# Patient Record
Sex: FEMALE | Race: WHITE | NOT HISPANIC OR LATINO | Employment: OTHER | URBAN - METROPOLITAN AREA
[De-identification: names, ages, dates, MRNs, and addresses within clinical notes are randomized per-mention and may not be internally consistent; named-entity substitution may affect disease eponyms.]

---

## 2018-04-02 ENCOUNTER — CONVERSION ENCOUNTER (OUTPATIENT)
Dept: OTHER | Facility: HOSPITAL | Age: 78
End: 2018-04-02

## 2018-04-02 ENCOUNTER — OFFICE VISIT CONVERTED (OUTPATIENT)
Dept: CARDIOLOGY | Facility: CLINIC | Age: 78
End: 2018-04-02
Attending: SPECIALIST

## 2018-07-05 ENCOUNTER — OFFICE VISIT CONVERTED (OUTPATIENT)
Dept: CARDIOLOGY | Facility: CLINIC | Age: 78
End: 2018-07-05
Attending: SPECIALIST

## 2018-07-05 ENCOUNTER — CONVERSION ENCOUNTER (OUTPATIENT)
Dept: OTHER | Facility: HOSPITAL | Age: 78
End: 2018-07-05

## 2018-11-05 ENCOUNTER — CONVERSION ENCOUNTER (OUTPATIENT)
Dept: OTHER | Facility: HOSPITAL | Age: 78
End: 2018-11-05

## 2018-11-05 ENCOUNTER — OFFICE VISIT CONVERTED (OUTPATIENT)
Dept: CARDIOLOGY | Facility: CLINIC | Age: 78
End: 2018-11-05
Attending: SPECIALIST

## 2019-05-06 ENCOUNTER — CONVERSION ENCOUNTER (OUTPATIENT)
Dept: OTHER | Facility: HOSPITAL | Age: 79
End: 2019-05-06

## 2019-05-06 ENCOUNTER — OFFICE VISIT CONVERTED (OUTPATIENT)
Dept: CARDIOLOGY | Facility: CLINIC | Age: 79
End: 2019-05-06
Attending: SPECIALIST

## 2019-11-11 ENCOUNTER — OFFICE VISIT CONVERTED (OUTPATIENT)
Dept: CARDIOLOGY | Facility: CLINIC | Age: 79
End: 2019-11-11
Attending: SPECIALIST

## 2019-11-11 ENCOUNTER — CONVERSION ENCOUNTER (OUTPATIENT)
Dept: OTHER | Facility: HOSPITAL | Age: 79
End: 2019-11-11

## 2020-07-20 ENCOUNTER — OFFICE VISIT CONVERTED (OUTPATIENT)
Dept: CARDIOLOGY | Facility: CLINIC | Age: 80
End: 2020-07-20
Attending: SPECIALIST

## 2020-08-05 ENCOUNTER — OFFICE VISIT CONVERTED (OUTPATIENT)
Dept: NEUROSURGERY | Facility: CLINIC | Age: 80
End: 2020-08-05
Attending: PHYSICIAN ASSISTANT

## 2020-09-09 ENCOUNTER — OFFICE VISIT CONVERTED (OUTPATIENT)
Dept: NEUROSURGERY | Facility: CLINIC | Age: 80
End: 2020-09-09
Attending: PHYSICIAN ASSISTANT

## 2021-01-25 ENCOUNTER — OFFICE VISIT CONVERTED (OUTPATIENT)
Dept: CARDIOLOGY | Facility: CLINIC | Age: 81
End: 2021-01-25
Attending: SPECIALIST

## 2021-05-10 NOTE — H&P
History and Physical      Patient Name: Leonora Yu   Patient ID: [id number]   Sex: Female   YOB: 1940    Primary Care Provider: Stan Corea MD   Referring Provider: Stan Corea MD    Visit Date: August 5, 2020    Provider: Gillian Cho PA-C   Location: [facility]   Location Address: [address] [place] [address]  [id number]   Location Phone: [phone]          Chief Complaint  · Back pain      History Of Present Illness  The patient is a 79 year old /White female, who presents on referral from Stan Corea MD, for a neurosurgical evaluation of low back pain and and numbness in her feet.   The pain developed gradually over ten years but worse over the past several months. It is mild to severe depending on activity level. has an aching, a sharp, and quality and does not radiate. The pain has been constant. The pain tends to be maximal at no specific time, but waxes and wanes in severity throughout the day. The patient states the pain is aggravated by bending, lifting, and housework. Improves some with rest.   She denies bowel or bladder dysfunction. The patient has no prior history of neck or back surgery.   RECENT INTERVENTIONS:  She has been previously treated with NSAIDs.   INFORMATION REVIEWED:  The following information was reviewed: radiology reports. an MRI of the lumbar spine and from 2011 report describes foraminal stenosis at L3/4 with spondylolisthesis at this level.      Numbness in the feet worse over the past two months and difficulty driving due to the numbness. He  is disabled and she needs to be able to drive. She is not a diabetic.       Past Medical History  Arrhythmia; Candidiasis of skin; Frequency; High blood pressure; High cholesterol; Hypothyroidism; Mixed Incontinence; Stress Incontinence; Urge Incontinence; Urgency         Past Surgical History  Hysterectomy; Hysterectomy-Abdominal         Medication List  Crestor oral;  "hydrochlorothiazide oral; lisinopril oral; nystatin 100,000 unit/gram topical cream; Premarin oral; Synthroid oral; Toprol XL oral; Toprol XL 25 mg oral tablet extended release 24 hr         Allergy List  NO KNOWN DRUG ALLERGIES; niacin       Allergies Reconciled  Family Medical History  Breast Neoplasm, Malignant; Heart Attack (MI); Family history of breast cancer         Social History  Alcohol (Never); Caffeine (Current some day); lives with spouse; ; Retired; Second hand smoke exposure (Never); Tobacco (Never)         Review of Systems  · Constitutional  o Denies  o : chills, excessive sweating, fatigue, fever, sycope/passing out, weight gain, weight loss  · Eyes  o Denies  o : changes in vision, blurry vision, double vision  · HENT  o Denies  o : loss of hearing, ringing in the ears, ear aches, sore throat, nasal congestion, sinus pain, nose bleeds, seasonal allergies  · Cardiovascular  o Denies  o : blood clots, swollen legs, anemia, easy burising or bleeding, transfusions  · Respiratory  o Denies  o : shortness of breath, dry cough, productive cough, pneumonia, COPD  · Gastrointestinal  o Denies  o : difficulty swallowing, reflux  · Genitourinary  o Denies  o : incontinence  · Neurologic  o Admits  o : numbness/tingling/paresthesia   o Denies  o : headache, seizure, stroke, tremor, loss of balance, falls, dizziness/vertigo, difficulty with sleep, difficulty with coordination, difficulty with dexterity, weakness  · Musculoskeletal  o Admits  o : low back pain  o Denies  o : neck stiffness/pain, swollen lymph nodes, muscle aches, joint pain, weakness, spasms, sciatica, pain radiating in arm, pain radiating in leg  · Endocrine  o Denies  o : diabetes, thyroid disorder  · Psychiatric  o Denies  o : anxiety, depression  · All Others Negative      Vitals  Date Time BP Position Site L\R Cuff Size HR RR TEMP (F) WT  HT  BMI kg/m2 BSA m2 O2 Sat        08/05/2020 10:28 AM        97.3 178lbs 9oz 5'  2\" 32.66 " 1.88           Physical Examination  · Constitutional  o Appearance  o : well-nourished, well developed, alert, in no acute distress  · Respiratory  o Respiratory Effort  o : breathing unlabored  · Cardiovascular  o Peripheral Vascular System  o :   § Extremities  § : no edema or cyanosis  · Musculoskeletal  o Spine  o :   § Inspection/Palpation  § : no spinal tenderness or misalignment  o Right Lower Extremity  o :   § Inspection/Palpation  § : no joint or limb tenderness to palpation, no edema present, no ecchymosis  § Joint Stability  § : joint stability within normal limits  § Range of Motion  § : range of motion normal, no joint crepitations present, no pain on motion, Marco's test negative  o Left Lower Extremity  o :   § Inspection/Palpation  § : no joint or limb tenderness to palpation, no edema present, no ecchymosis  § Joint Stability  § : joint stability within normal limits  § Range of Motion  § : range of motion normal, no joint crepitations present, no pain on motion, Marco's test negative  · Skin and Subcutaneous Tissue  o Extremities  o :   § Right Lower Extremity  § : no lesions or areas of discoloration  § Left Lower Extremity  § : no lesions or areas of discoloration  o Back  o : no lesions or areas of discoloration  · Neurologic  o Mental Status Examination  o :   § Orientation  § : alert and oriented to time, person, place and events  o Motor Examination  o :   § RLE Strength  § : strength normal  § RLE Motor Function  § : tone normal, no atrophy, no abnormal movements noted  § LLE Strength  § : strength normal  § LLE Motor Function  § : tone normal, no atrophy, no abnormal movements noted  o Reflexes  o :   § RLE  § : knee and ankle reflexes 1/4, SLR negative  § LLE  § : knee and ankle reflexes 1/4, SLR negative  o Sensation  o :   § Light Touch  § : sensation intact to light touch in extremities  o Gait and Station  o :   § Gait Screening  § : normal gait, able to stand without  difficulty  · Psychiatric  o Mood and Affect  o : mood normal, affect appropriate          Assessment  · Acquired spondylolisthesis , lumbar     738.4/M43.19  L3/4  · Degeneration of lumbosacral intervertebral disc     722.52/M51.37  · Foraminal stenosis of lumbar region     724.02/M99.83  · Lumbago/low back pain     724.3/M54.40  · Paresthesia     782.0/R20.2    Problems Reconciled  Plan  · Orders  o MRI lumbar spine wo contrast (32087) - 738.4/M43.19, 724.02/M99.83, 724.3/M54.40, 782.0/R20.2 - 08/05/2020   [facility] on a Wed. or Friday  o XR lumbar spine, 2-3 bending views (53334) - 738.4/M43.19 - 08/05/2020  · Medications  o Medications have been Reconciled  o Transition of Care or Provider Policy  · Instructions  o Encouraged to follow-up with Primary Care Provider for preventative care.  o The ROS and the PFSH were reviewed at today's visit.  o Call or return to office if symptoms worsen or persist.  o I will order a MRI lumbar spine. I am concerned for lumbar stenosis. Lumbar bending xrays needed to evaluate for instability at L3/4 with spondylolisthesis. F/U to discuss results.             Electronically Signed by: Gillian Cho PA-C -Author on August 5, 2020 11:48:58 AM

## 2021-05-13 NOTE — PROGRESS NOTES
"   Progress Note      Patient Name: Leonora Yu   Patient ID: [id number]   Sex: Female   YOB: 1940    Primary Care Provider: Stan Corea MD   Referring Provider: Stan Corea MD    Visit Date: July 20, 2020    Provider: Jerome Morgan MD   Location: [facility]   Location Address: [address] [place] [address]  [id number]   Location Phone: [phone]          Chief Complaint  · Hypertension  · Palpitations      History Of Present Illness  Leonora Yu is an 79 year old /White female with a history of hypertension; palpitations. No further palpitations. No syncopal or presyncopal episodes. Patient denies chest pain. No shortness of breath, PND or orthopnea.   Medications  Meds at present include: Lisinopril 10 mg qd; Toprol XL 25 mg bid; Tramadol 50 mg prn; Hydrochlorothiazide 25 mg qod; Crestor 10 mg qod; Levothyroxine 125 mcg qd; Potassium Citrate 15 meq bid; Vitamin D3 q week.   PAST MEDICAL HISTORY: negative for diabetes and chest pain; positive for hypertension and hyperlipidemia.   FAMILY HISTORY: negative for diabetes, hypertension and heart disease.   PSYCHO/SOCIAL HISTORY: negative for depression and mood changes; does not drink alcohol and does not smoke.       Review of Systems  · Cardiovascular  o Denies  o : palpitations (fast, fluttering, or skipping beats), swelling (feet, ankles, hands), shortness of breath while walking or lying flat, chest pain or angina pectoris   · Respiratory  o Denies  o : chronic or frequent cough, asthma or wheezing      Vitals  Date Time BP Position Site L\R Cuff Size HR RR TEMP (F) WT  HT  BMI kg/m2 BSA m2 O2 Sat HC       07/20/2020 10:26 /66 Sitting    85 - R   179lbs 6oz 5'  2\" 32.81 1.89     07/20/2020 10:26 /80 Sitting    88 - R   179lbs 6oz 5'  2\" 32.81 1.89           Physical Examination  · Constitutional  o Appearance  o : Alert and Oriented X3. The patient is well built and well " nourished.  · Respiratory  o Inspection of Chest  o : No chest wall deformities, moving equal  o Auscultation of Lungs  o : Good air entry with vesicular breath sounds  · Cardiovascular  o Heart  o :   § Auscultation of Heart  § : S1 and S2 irregular. No S3. No S4. No murmurs.  o Peripheral Vascular System  o :   § Extremities  § : Peripheral pulses were well felt. No edema. No cyanosis.  · Gastrointestinal  o Abdominal Examination  o : No masses or tenderness noted.   · EKG  o EKG  o : was done today  o Indications  o : hypertension  o Results  o : shows sinus rhythm; right atrial enlargement; left axis deviation  o Comparison  o : no change from previous EKG          Assessment     IMPRESSION/PLAN    1.  Palpitations stable. Continue current dose of Toprol.  2.  Essential hypertension controlled.  Continue current dose of Lisinopril and Hyrochlorathiazide.    3.  Hyperlipidemia. Continue current dose of Crestor managed by her PMD.    4.  See me back in 6 months.      MD HOMERO Eugene/wt       Plan  · Instructions  o This note was transcribed by Lilian Rod. HOMERO/wt  o The above service was transcribed by Lilian Rod on my behalf and I attest to the accuracy of the note. HOMERO            Electronically Signed by: Linnea Rod-, -Author on July 22, 2020 10:29:38 AM  Electronically Co-signed by: Jerome Morgan MD -Reviewer on July 24, 2020 08:35:50 AM

## 2021-05-14 VITALS — TEMPERATURE: 97.1 F | HEIGHT: 62 IN | BODY MASS INDEX: 33.35 KG/M2 | WEIGHT: 181.25 LBS

## 2021-05-14 VITALS
HEART RATE: 90 BPM | DIASTOLIC BLOOD PRESSURE: 85 MMHG | HEIGHT: 62 IN | BODY MASS INDEX: 33.88 KG/M2 | SYSTOLIC BLOOD PRESSURE: 145 MMHG | WEIGHT: 184.12 LBS

## 2021-05-14 NOTE — PROGRESS NOTES
"   Progress Note      Patient Name: Leonora Yu   Patient ID: [id number]   Sex: Female   YOB: 1940    Primary Care Provider: Stan Corea MD   Referring Provider: Stan Corea MD    Visit Date: January 25, 2021    Provider: Jerome Morgan MD   Location: [facility]   Location Address: [address] [place] [address]  [id number]   Location Phone: [phone]          Chief Complaint  · Hypertension  · Palpitations      History Of Present Illness  Leonora Yu is an 79 year old /White female with a history of hypertension; palpitations. No further palpitations. No syncopal or presyncopal episodes. Patient denies chest pain. No shortness of breath, PND or orthopnea.   Medications  Meds at present include: Lisinopril 10 mg qd; Toprol Succ ER2 5 mg bid; Hydrochlorothiazide 25 mg qod; Crestor 10 mg qod; Levothyroxine 125 mcg qd; Potassium Citrate 15 meq bid; Vitamin D2 q week.   PAST MEDICAL HISTORY: negative for diabetes and chest pain; positive for hypertension and hyperlipidemia.   PSYCHO/SOCIAL HISTORY: does not drink alcohol and does not smoke.       Review of Systems  · Cardiovascular  o Denies  o : palpitations (fast, fluttering, or skipping beats), swelling (feet, ankles, hands), shortness of breath while walking or lying flat, chest pain or angina pectoris   · Respiratory  o Denies  o : chronic or frequent cough, asthma or wheezing      Vitals  Date Time BP Position Site L\R Cuff Size HR RR TEMP (F) WT  HT  BMI kg/m2 BSA m2 O2 Sat FR L/min FiO2 HC       01/25/2021 11:31 /85 Sitting    90 - R   184lbs 2oz 5'  2\" 33.68 1.91       01/25/2021 11:31 /78 Sitting    88 - R                   Physical Examination  · Constitutional  o Appearance  o : Alert and Oriented X3. The patient is well built and well nourished.  · Respiratory  o Inspection of Chest  o : No chest wall deformities, moving equal  o Auscultation of Lungs  o : Good air " entry with vesicular breath sounds  · Cardiovascular  o Heart  o :   § Auscultation of Heart  § : S1 and S2 irregular. No S3. No S4. No murmurs.  o Peripheral Vascular System  o :   § Extremities  § : Peripheral pulses were well felt. No edema. No cyanosis.  · Gastrointestinal  o Abdominal Examination  o : No masses or tenderness noted.           Assessment     IMPRESSION/PLAN    1.  Palpitations stable. Continue current dose of Toprol.  2.  Essential hypertension controlled.  Continue current dose of Lisinopril and Hyrochlorathiazide.    3.  Hyperlipidemia. Continue current dose of Crestor managed by her PMD.    4.  See me back in 6 months.      MD HOMERO Eugene/wt       Plan  · Instructions  o This note was transcribed by Lilian Rod. HOMERO/wt  o The above service was transcribed by Lilian Rod on my behalf and I attest to the accuracy of the note. HOMERO            Electronically Signed by: Linnea Rod-, -Author on January 26, 2021 02:17:17 PM  Electronically Co-signed by: Jerome Morgan MD -Reviewer on February 8, 2021 01:32:14 PM

## 2021-05-15 VITALS — WEIGHT: 178.56 LBS | HEIGHT: 62 IN | BODY MASS INDEX: 32.86 KG/M2 | TEMPERATURE: 97.3 F

## 2021-05-15 VITALS
SYSTOLIC BLOOD PRESSURE: 146 MMHG | WEIGHT: 179.37 LBS | BODY MASS INDEX: 33.01 KG/M2 | HEIGHT: 62 IN | DIASTOLIC BLOOD PRESSURE: 66 MMHG | HEART RATE: 85 BPM

## 2021-05-15 VITALS
HEIGHT: 62 IN | SYSTOLIC BLOOD PRESSURE: 133 MMHG | HEART RATE: 72 BPM | DIASTOLIC BLOOD PRESSURE: 70 MMHG | BODY MASS INDEX: 32.02 KG/M2 | WEIGHT: 174 LBS

## 2021-05-15 VITALS
HEIGHT: 62 IN | BODY MASS INDEX: 32.76 KG/M2 | WEIGHT: 178 LBS | HEART RATE: 79 BPM | SYSTOLIC BLOOD PRESSURE: 124 MMHG | DIASTOLIC BLOOD PRESSURE: 82 MMHG

## 2021-05-16 VITALS
SYSTOLIC BLOOD PRESSURE: 130 MMHG | DIASTOLIC BLOOD PRESSURE: 81 MMHG | WEIGHT: 180 LBS | HEIGHT: 62 IN | BODY MASS INDEX: 33.13 KG/M2 | HEART RATE: 72 BPM

## 2021-05-16 VITALS
SYSTOLIC BLOOD PRESSURE: 159 MMHG | DIASTOLIC BLOOD PRESSURE: 96 MMHG | HEIGHT: 63 IN | WEIGHT: 180 LBS | BODY MASS INDEX: 31.89 KG/M2 | HEART RATE: 76 BPM

## 2021-05-16 VITALS
SYSTOLIC BLOOD PRESSURE: 131 MMHG | DIASTOLIC BLOOD PRESSURE: 82 MMHG | BODY MASS INDEX: 33.13 KG/M2 | HEIGHT: 62 IN | WEIGHT: 180 LBS | HEART RATE: 62 BPM

## 2021-09-18 NOTE — PROGRESS NOTES
[facility]  Cardiology progress Note    Patient Name: Leonora Yu  : 1940    CHIEF COMPLAINT  Hypertension, palpitations.      Subjective   Subjective     HISTORY OF PRESENT ILLNESS  Leonora Yu is an 80 year old /White female with a history of hypertension; palpitations. No further palpitations. No syncopal or presyncopal episodes. Patient denies chest pain. No shortness of breath, PND or orthopnea.      Review of Systems:   Constitutional no fever,  no weight loss   Skin no rash   Otolaryngeal no difficulty swallowing   Cardiovascular See HPI   Pulmonary no cough, no sputum production   Gastrointestinal no constipation, no diarrhea   Genitourinary no dysuria, no hematuria   Hematologic no easy bruisability, no abnormal bleeding   Musculoskeletal no muscle pain   Neurologic no dizziness, no falls         Personal History     Social History:  reports that she has never smoked. She has never used smokeless tobacco. She reports previous alcohol use. She reports that she does not use drugs.    Home Medications:  Current Outpatient Medications on File Prior to Visit   Medication Sig   • levothyroxine (SYNTHROID, LEVOTHROID) 125 MCG tablet Take 125 mcg by mouth Daily.   • metoprolol succinate XL (TOPROL-XL) 25 MG 24 hr tablet Take 25 mg by mouth 2 (two) times a day.   • potassium chloride (Klor-Con) 20 MEQ packet Take 1 packet by mouth 4 (Four) Times a Day.   • [DISCONTINUED] hydroCHLOROthiazide (HYDRODIURIL) 25 MG tablet Take 25 mg by mouth Daily.   • [DISCONTINUED] lisinopril (PRINIVIL,ZESTRIL) 10 MG tablet Take 10 mg by mouth Daily.     No current facility-administered medications on file prior to visit.     Allergies:  Allergies   Allergen Reactions   • Niacin Rash       Objective    Objective       Vitals:   Heart Rate:  [72-74] 74  BP: (148-158)/(88-94) 148/88  Body mass index is 33.65 kg/m².     Physical Exam:   Constitutional: Awake, alert, No acute distress    Eyes:  PERRLA, sclerae anicteric, no conjunctival injection   HENT: NCAT, mucous membranes moist   Neck: Supple, no thyromegaly, no lymphadenopathy, trachea midline   Respiratory: Clear to auscultation bilaterally, nonlabored respirations    Cardiovascular: RRR, no murmurs or rubs. Palpable pedal pulses bilaterally   Musculoskeletal: No bilateral ankle edema, no cyanosis to extremities   Psychiatric: Appropriate affect, cooperative   Neurologic: Oriented x 3, strength symmetric in all extremities, Cranial Nerves grossly intact to confrontation, speech clear   Skin: No rashes.    Result Review    Result Review:  I have personally reviewed the available results from  [x]  Laboratory  [x]  EKG  [x]  Cardiology  [x]  Medications  [x]  Old records  []  Other:   Procedures    Impression/Plan:  1.  Palpitations stable. Continue current dose of Toprol.  2.  Essential hypertension controlled.  Continue current dose of Lisinopril and Hyrochlorathiazide.  Blood pressure well controlled at home.  3.  Hyperlipidemia. Continue current dose of Crestor.  Check lipid and hepatic profile.  4.  Hypothyroidism: Continue Synthroid.  Jerome Morgan MD   09/20/21   12:14 EDT

## 2021-09-20 ENCOUNTER — OFFICE VISIT (OUTPATIENT)
Dept: CARDIOLOGY | Facility: CLINIC | Age: 81
End: 2021-09-20

## 2021-09-20 VITALS
DIASTOLIC BLOOD PRESSURE: 88 MMHG | HEART RATE: 74 BPM | SYSTOLIC BLOOD PRESSURE: 148 MMHG | BODY MASS INDEX: 33.86 KG/M2 | HEIGHT: 62 IN | WEIGHT: 184 LBS

## 2021-09-20 DIAGNOSIS — E02 SUBCLINICAL IODINE-DEFICIENCY HYPOTHYROIDISM: ICD-10-CM

## 2021-09-20 DIAGNOSIS — I10 HYPERTENSION, ESSENTIAL: ICD-10-CM

## 2021-09-20 DIAGNOSIS — E78.2 HYPERLIPEMIA, MIXED: Primary | ICD-10-CM

## 2021-09-20 PROCEDURE — 99214 OFFICE O/P EST MOD 30 MIN: CPT | Performed by: SPECIALIST

## 2021-09-20 RX ORDER — POTASSIUM CHLORIDE 1.5 G/1.77G
1 POWDER, FOR SOLUTION ORAL 4 TIMES DAILY
COMMUNITY
End: 2022-10-10 | Stop reason: ALTCHOICE

## 2021-09-20 RX ORDER — METOPROLOL SUCCINATE 25 MG/1
25 TABLET, EXTENDED RELEASE ORAL 2 TIMES DAILY
COMMUNITY
Start: 2021-07-09 | End: 2022-02-15

## 2021-09-20 RX ORDER — LISINOPRIL 10 MG/1
10 TABLET ORAL DAILY
Qty: 90 TABLET | Refills: 3 | Status: SHIPPED | OUTPATIENT
Start: 2021-09-20 | End: 2022-12-19

## 2021-09-20 RX ORDER — HYDROCHLOROTHIAZIDE 25 MG/1
25 TABLET ORAL DAILY
Qty: 90 TABLET | Refills: 3 | Status: SHIPPED | OUTPATIENT
Start: 2021-09-20

## 2021-09-20 RX ORDER — LISINOPRIL 10 MG/1
10 TABLET ORAL DAILY
COMMUNITY
Start: 2021-08-18 | End: 2021-09-20 | Stop reason: SDUPTHER

## 2021-09-20 RX ORDER — LEVOTHYROXINE SODIUM 0.12 MG/1
125 TABLET ORAL DAILY
COMMUNITY

## 2021-09-20 RX ORDER — HYDROCHLOROTHIAZIDE 25 MG/1
25 TABLET ORAL DAILY
COMMUNITY
End: 2021-09-20 | Stop reason: SDUPTHER

## 2022-02-15 RX ORDER — METOPROLOL SUCCINATE 25 MG/1
TABLET, EXTENDED RELEASE ORAL
Qty: 180 TABLET | Refills: 2 | Status: SHIPPED | OUTPATIENT
Start: 2022-02-15 | End: 2023-01-04

## 2022-04-03 NOTE — PROGRESS NOTES
[facility]  Cardiology progress Note    Patient Name: Leonora Yu  : 1940    CHIEF COMPLAINT  Palpitations.      Subjective   Subjective     HISTORY OF PRESENT ILLNESS    Leonora Yu is a 81 y.o. female with history of palpitations.  No chest pain or shortness of breath.    Review of Systems:   Constitutional no fever,  no weight loss   Skin no rash   Otolaryngeal no difficulty swallowing   Cardiovascular See HPI   Pulmonary no cough, no sputum production   Gastrointestinal no constipation, no diarrhea   Genitourinary no dysuria, no hematuria   Hematologic no easy bruisability, no abnormal bleeding   Musculoskeletal no muscle pain   Neurologic no dizziness, no falls         Personal History     Social History:  reports that she has never smoked. She has never used smokeless tobacco. She reports previous alcohol use. She reports that she does not use drugs.    Home Medications:  Current Outpatient Medications on File Prior to Visit   Medication Sig   • hydroCHLOROthiazide (HYDRODIURIL) 25 MG tablet Take 1 tablet by mouth Daily.   • levothyroxine (SYNTHROID, LEVOTHROID) 125 MCG tablet Take 125 mcg by mouth Daily.   • lisinopril (PRINIVIL,ZESTRIL) 10 MG tablet Take 1 tablet by mouth Daily.   • metoprolol succinate XL (TOPROL-XL) 25 MG 24 hr tablet TAKE ONE TABLET BY MOUTH TWICE A DAY   • potassium chloride (KLOR-CON) 20 MEQ packet Take 1 packet by mouth 4 (Four) Times a Day.     No current facility-administered medications on file prior to visit.     Allergies:  Allergies   Allergen Reactions   • Niacin Rash       Objective    Objective       Vitals:   Heart Rate:  [78] 78  BP: (140)/(74) 140/74  Body mass index is 33.11 kg/m².     Physical Exam:   Constitutional: Awake, alert, No acute distress    Eyes: PERRLA, sclerae anicteric, no conjunctival injection   HENT: NCAT, mucous membranes moist   Neck: Supple, no thyromegaly, no lymphadenopathy, trachea midline   Respiratory: Clear to  auscultation bilaterally, nonlabored respirations    Cardiovascular: RRR, no murmurs or rubs. Palpable pedal pulses bilaterally   Musculoskeletal: No bilateral ankle edema, no cyanosis to extremities   Psychiatric: Appropriate affect, cooperative   Neurologic: Oriented x 3, strength symmetric in all extremities, Cranial Nerves grossly intact to confrontation, speech clear   Skin: No rashes.    Result Review    Result Review:  I have personally reviewed the available results from  [x]  Laboratory  [x]  EKG  [x]  Cardiology  [x]  Medications  [x]  Old records  []  Other:   Procedures     Impression/Plan:  1. Stable palpitations: Continue Toprol-XL 25 mg once a day.  Further palpitations.  2. Essential hypertension controlled: Continue Zestril 10 mg a day.  Continue hydrochlorothiazide 25 mg once a day.  Pressure controlled at home.  2.  Hypothyroidism: Continue Synthroid 125 mcg once a day.        Jerome Morgan MD   04/04/22   11:15 EDT

## 2022-04-04 ENCOUNTER — OFFICE VISIT (OUTPATIENT)
Dept: CARDIOLOGY | Facility: CLINIC | Age: 82
End: 2022-04-04

## 2022-04-04 VITALS
WEIGHT: 181 LBS | DIASTOLIC BLOOD PRESSURE: 74 MMHG | HEIGHT: 62 IN | BODY MASS INDEX: 33.31 KG/M2 | SYSTOLIC BLOOD PRESSURE: 140 MMHG | HEART RATE: 78 BPM

## 2022-04-04 DIAGNOSIS — I10 HYPERTENSION, ESSENTIAL: ICD-10-CM

## 2022-04-04 DIAGNOSIS — E78.2 HYPERLIPEMIA, MIXED: ICD-10-CM

## 2022-04-04 DIAGNOSIS — R00.2 PALPITATIONS: Primary | ICD-10-CM

## 2022-04-04 PROCEDURE — 99214 OFFICE O/P EST MOD 30 MIN: CPT | Performed by: SPECIALIST

## 2022-06-01 NOTE — PROGRESS NOTES
Progress Note      Patient Name: Leonora Yu   Patient ID: [id number]   Sex: Female   YOB: 1940    Primary Care Provider: Stan Corea MD   Referring Provider: Stan Corea MD    Visit Date: September 9, 2020    Provider: Gillian Cho PA-C   Location: [facility]   Location Address: [address] [place] [address]  [id number]   Location Phone: [phone]          Chief Complaint  · Follow-up      History Of Present Illness  The patient is a 79 year old /White female who is in the office for followup appointment.      Here for f/u to discuss imaging.  MRI lumbar spine showed levoscoliosis with spondylolisthesis at L2/3, L3/4, and L4/5.  Multilevel degenerative changes. Her feet feel numb and heavy and this effect her driving.  She does not have clear lumbar stenosis to explain her symptoms in her feet.  She is on B12 injections at this time which could be a B12 deficit neuropathy.       Past Medical History  Arrhythmia; Candidiasis of skin; Frequency; High blood pressure; High cholesterol; Hypothyroidism; Mixed Incontinence; Stress Incontinence; Urge Incontinence; Urgency         Past Surgical History  Hysterectomy; Hysterectomy-Abdominal         Medication List  bisoprolol fumarate 10 mg oral tablet; esomeprazole magnesium 40 mg oral granules DR for susp in packet; fexofenadine 180 mg oral tablet; Fish Oil 1,000 mg (120 mg-180 mg) oral capsule; Klor-Con 20 mEq oral packet; spironolactone 25 mg oral tablet; Vitamin D3 50 mcg (2,000 unit) oral tablet         Allergy List  NO KNOWN DRUG ALLERGIES; niacin       Allergies Reconciled  Family Medical History  Breast Neoplasm, Malignant; Heart Attack (MI); Family history of breast cancer         Social History  Alcohol (Never); Caffeine (Current some day); lives with spouse; ; Retired; Second hand smoke exposure (Never); Tobacco (Never)         Review of Systems  · Constitutional  o Denies  o :  "chills, excessive sweating, fatigue, fever, sycope/passing out, weight gain, weight loss  · Eyes  o Denies  o : changes in vision, blurry vision, double vision  · HENT  o Denies  o : loss of hearing, ringing in the ears, ear aches, sore throat, nasal congestion, sinus pain, nose bleeds, seasonal allergies  · Cardiovascular  o Denies  o : blood clots, swollen legs, anemia, easy burising or bleeding, transfusions  · Respiratory  o Denies  o : shortness of breath, dry cough, productive cough, pneumonia, COPD  · Gastrointestinal  o Denies  o : difficulty swallowing, reflux  · Genitourinary  o Denies  o : incontinence  · Neurologic  o Admits  o : numbness/tingling/paresthesia   o Denies  o : headache, seizure, stroke, tremor, loss of balance, falls, dizziness/vertigo, difficulty with sleep, difficulty with coordination, difficulty with dexterity, weakness  · Musculoskeletal  o Admits  o : pain radiating in leg, low back pain  o Denies  o : neck stiffness/pain, swollen lymph nodes, muscle aches, joint pain, weakness, spasms, sciatica, pain radiating in arm  · Endocrine  o Denies  o : diabetes, thyroid disorder  · Psychiatric  o Denies  o : anxiety, depression  · All Others Negative      Vitals  Date Time BP Position Site L\R Cuff Size HR RR TEMP (F) WT  HT  BMI kg/m2 BSA m2 O2 Sat        09/09/2020 01:38 PM        97.1 181lbs 4oz 5'  2\" 33.15 1.9           Physical Examination  · Constitutional  o Appearance  o : well-nourished, well developed, alert, in no acute distress  · Respiratory  o Respiratory Effort  o : breathing unlabored  · Cardiovascular  o Peripheral Vascular System  o :   § Extremities  § : no cyanosis, clubbing or edema  · Neurologic  o Mental Status Examination  o :   § Orientation  § : grossly oriented to person, place and time  o Motor Examination  o :   § RLE Strength  § : strength normal  § RLE Motor Function  § : tone normal, no atrophy, no abnormal movements noted  § LLE Strength  § : strength " normal  § LLE Motor Function  § : tone normal, no atrophy, no abnormal movements noted  o Reflexes  o :   § RLE  § : 1/4 knee and ankle reflex  § LLE  § : 1/4 knee and ankle reflex  o Sensation  o :   § Light Touch  § : sensation intact to light touch in extremities  o Gait and Station  o :   § Gait Screening  § : slow and slightly antalgic  · Psychiatric  o Mood and Affect  o : mood normal, affect appropriate          Assessment  · Acquired spondylolisthesis , lumbar     738.4/M43.19  L3/4  · Degeneration of lumbosacral intervertebral disc     722.52/M51.37  · Foraminal stenosis of lumbar region     724.02/M99.83  · Lumbago/low back pain     724.3/M54.40  · Neuropathy     355.9/G62.9    Problems Reconciled  Plan  · Medications  o Medications have been Reconciled  o Transition of Care or Provider Policy  · Instructions  o The ROS and the PFSH were reviewed at today's visit.  o Call or return to office if symptoms worsen or persist.   o She does not have lumbar stenosis at L4/5 and L5/S1 to explain her paresthesias in the feet. She has symptoms of neuropathy and is already on B12 injections. She will discuss this diagnosis further with her PCP and may consider starting gabapentin to see if this reduces her symptoms.   · Associate Tasks  o Task ID [id number] General Task: send office note to her PCP, Dr. Corea please            Electronically Signed by: Gillian Cho PA-C -Author on September 9, 2020 02:28:52 PM   No

## 2022-10-07 NOTE — PROGRESS NOTES
[facility]  Cardiology progress Note    Patient Name: Leonora Yu  : 1940    CHIEF COMPLAINT  Palpitations        Subjective   Subjective     HISTORY OF PRESENT ILLNESS    Leonora Yu is a 81 y.o. female with palpitations and hypertension.  No chest pain or shortness of breath.    REVIEW OF SYSTEMS    Constitutional:    No fever, no weight loss  Skin:     No rash  Otolaryngeal:    No difficulty swallowing  Cardiovascular: See HPI.  Pulmonary:    No cough, no sputum production    Personal History     Social History:    reports that she has never smoked. She has never used smokeless tobacco. She reports that she does not currently use alcohol. She reports that she does not use drugs.    Home Medications:  Current Outpatient Medications on File Prior to Visit   Medication Sig   • hydroCHLOROthiazide (HYDRODIURIL) 25 MG tablet Take 1 tablet by mouth Daily.   • levothyroxine (SYNTHROID, LEVOTHROID) 125 MCG tablet Take 125 mcg by mouth Daily.   • lisinopril (PRINIVIL,ZESTRIL) 10 MG tablet Take 1 tablet by mouth Daily.   • metoprolol succinate XL (TOPROL-XL) 25 MG 24 hr tablet TAKE ONE TABLET BY MOUTH TWICE A DAY   • [DISCONTINUED] potassium chloride (KLOR-CON) 20 MEQ packet Take 1 packet by mouth 4 (Four) Times a Day.     No current facility-administered medications on file prior to visit.       Past Medical History:   Diagnosis Date   • Arrhythmia    • Candidosis of skin 2014   • Condition not found 2016    FREQUENCY   • HBP (high blood pressure)    • High cholesterol    • Hypothyroidism    • Mixed incontinence 2016   • Stress incontinence 2014   • Urge incontinence 2014   • Urgency incontinence 2016       Allergies:  Allergies   Allergen Reactions   • Niacin Rash       Objective    Objective       Vitals:   Heart Rate:  [76-86] 76  BP: (130-146)/(64-68) 130/64  Body mass index is 34.02 kg/m².     PHYSICAL EXAM:    General Appearance:   · well  developed  · well nourished  HENT:   · oropharynx moist  · lips not cyanotic  Neck:  · thyroid not enlarged  · supple  Respiratory:  · no respiratory distress  · normal breath sounds  · no rales  Cardiovascular:  · no jugular venous distention  · regular rhythm  · apical impulse normal  · S1 normal, S2 normal  · no S3, no S4   · no murmur  · no rub, no thrill  · carotid pulses normal; no bruit  · pedal pulses normal  · lower extremity edema: none    Skin:   · warm, dry  Psychiatric:  · judgement and insight appropriate  · normal mood and affect        Result Review:  I have personally reviewed the available results from  [x]  Laboratory  [x]  EKG  [x]  Cardiology  [x]  Medications  [x]  Old records  []  Other:     Procedures     Impression/Plan:  1. Essential hypertension controlled: Continue Zestril 10 mg a day.  Continue hydrochlorothiazide 25 mg a day.  Blood pressure controlled at home.  2.  Stable palpitations: Continue Toprol-XL 25 mg a day.  No further palpitations.  3.  Hypothyroidism: Continue Synthroid 125 mcg a day for           Jerome Morgan MD   10/10/22   11:00 EDT

## 2022-10-10 ENCOUNTER — OFFICE VISIT (OUTPATIENT)
Dept: CARDIOLOGY | Facility: CLINIC | Age: 82
End: 2022-10-10

## 2022-10-10 VITALS
HEIGHT: 62 IN | DIASTOLIC BLOOD PRESSURE: 64 MMHG | BODY MASS INDEX: 34.23 KG/M2 | HEART RATE: 76 BPM | WEIGHT: 186 LBS | SYSTOLIC BLOOD PRESSURE: 130 MMHG

## 2022-10-10 DIAGNOSIS — R00.2 PALPITATIONS: Primary | ICD-10-CM

## 2022-10-10 PROCEDURE — 99214 OFFICE O/P EST MOD 30 MIN: CPT | Performed by: SPECIALIST

## 2022-12-19 RX ORDER — LISINOPRIL 10 MG/1
TABLET ORAL
Qty: 90 TABLET | Refills: 1 | Status: SHIPPED | OUTPATIENT
Start: 2022-12-19

## 2023-01-04 RX ORDER — METOPROLOL SUCCINATE 25 MG/1
TABLET, EXTENDED RELEASE ORAL
Qty: 180 TABLET | Refills: 2 | Status: SHIPPED | OUTPATIENT
Start: 2023-01-04

## 2023-04-20 NOTE — PROGRESS NOTES
[facility]  Cardiology progress Note    Patient Name: Leonora Yu  : 1940    CHIEF COMPLAINT  Palpitations        Subjective   Subjective     HISTORY OF PRESENT ILLNESS    Leonora Yu is a 82 y.o. female with history of palpitations.  No chest pain.  Palpitations stable    REVIEW OF SYSTEMS    Constitutional:    No fever, no weight loss  Skin:     No rash  Otolaryngeal:    No difficulty swallowing  Cardiovascular: See HPI.  Pulmonary:    No cough, no sputum production    Personal History     Social History:    reports that she has never smoked. She has never used smokeless tobacco. She reports that she does not currently use alcohol. She reports that she does not use drugs.    Home Medications:  Current Outpatient Medications on File Prior to Visit   Medication Sig   • hydroCHLOROthiazide (HYDRODIURIL) 25 MG tablet Take 1 tablet by mouth Daily.   • levothyroxine (SYNTHROID, LEVOTHROID) 125 MCG tablet Take 1 tablet by mouth Daily.   • lisinopril (PRINIVIL,ZESTRIL) 10 MG tablet TAKE ONE TABLET BY MOUTH DAILY   • metoprolol succinate XL (TOPROL-XL) 25 MG 24 hr tablet TAKE ONE TABLET BY MOUTH TWICE A DAY   • rosuvastatin (CRESTOR) 10 MG tablet Take 1 tablet by mouth Daily.     No current facility-administered medications on file prior to visit.       Past Medical History:   Diagnosis Date   • Arrhythmia    • Candidosis of skin 2014   • Condition not found 2016    FREQUENCY   • HBP (high blood pressure)    • High cholesterol    • Hypothyroidism    • Mixed incontinence 2016   • Stress incontinence 2014   • Urge incontinence 2014   • Urgency incontinence 2016       Allergies:  Allergies   Allergen Reactions   • Niacin Rash       Objective    Objective       Vitals:   Heart Rate:  [74] 74  BP: (130)/(90) 130/90  Body mass index is 35.12 kg/m².     PHYSICAL EXAM:    General Appearance:   · well developed  · well nourished  HENT:   · oropharynx moist  · lips  not cyanotic  Neck:  · thyroid not enlarged  · supple  Respiratory:  · no respiratory distress  · normal breath sounds  · no rales  Cardiovascular:  · no jugular venous distention  · regular rhythm  · apical impulse normal  · S1 normal, S2 normal  · no S3, no S4   · no murmur  · no rub, no thrill  · carotid pulses normal; no bruit  · pedal pulses normal  · lower extremity edema: none    Skin:   · warm, dry  Psychiatric:  · judgement and insight appropriate  · normal mood and affect        Result Review:  I have personally reviewed the available results from  [x]  Laboratory  [x]  EKG  [x]  Cardiology  [x]  Medications  [x]  Old records  []  Other:     Procedures     Impression/Plan:  1.  Stable palpitations: Continue Toprol-XL 25 mg once a day.  No further palpitations.  2.  Essential hypertension controlled: Continue Zestril 10 mg once a day.  Continue hydrochlorothiazide 25 mg once a day.  Blood pressure controlled at home.  3.  Hypothyroidism: Continue Synthroid 125 mcg once a day           Jerome Morgan MD   04/24/23   10:57 EDT

## 2023-04-24 ENCOUNTER — OFFICE VISIT (OUTPATIENT)
Dept: CARDIOLOGY | Facility: CLINIC | Age: 83
End: 2023-04-24
Payer: MEDICARE

## 2023-04-24 VITALS
DIASTOLIC BLOOD PRESSURE: 90 MMHG | WEIGHT: 192 LBS | HEART RATE: 74 BPM | SYSTOLIC BLOOD PRESSURE: 130 MMHG | HEIGHT: 62 IN | BODY MASS INDEX: 35.33 KG/M2

## 2023-04-24 DIAGNOSIS — I10 HYPERTENSION, ESSENTIAL: Primary | ICD-10-CM

## 2023-04-24 DIAGNOSIS — R00.2 PALPITATIONS: ICD-10-CM

## 2023-04-24 PROCEDURE — 1160F RVW MEDS BY RX/DR IN RCRD: CPT | Performed by: SPECIALIST

## 2023-04-24 PROCEDURE — 1159F MED LIST DOCD IN RCRD: CPT | Performed by: SPECIALIST

## 2023-04-24 PROCEDURE — 99214 OFFICE O/P EST MOD 30 MIN: CPT | Performed by: SPECIALIST

## 2023-04-24 RX ORDER — ROSUVASTATIN CALCIUM 10 MG/1
10 TABLET, COATED ORAL DAILY
COMMUNITY
Start: 2023-02-06

## 2023-07-31 RX ORDER — LISINOPRIL 10 MG/1
TABLET ORAL
Qty: 90 TABLET | Refills: 3 | Status: SHIPPED | OUTPATIENT
Start: 2023-07-31

## 2023-11-09 RX ORDER — METOPROLOL SUCCINATE 25 MG/1
TABLET, EXTENDED RELEASE ORAL
Qty: 180 TABLET | Refills: 0 | Status: SHIPPED | OUTPATIENT
Start: 2023-11-09

## 2023-12-15 NOTE — PROGRESS NOTES
[facility]  Cardiology progress Note    Patient Name: Leonora Yu  : 1940    CHIEF COMPLAINT  Palpitations        Subjective   Subjective     HISTORY OF PRESENT ILLNESS    Leonora Yu is a 83 y.o. female with history of palpitations and hypertension.  No further palpitations.  No chest pain.    REVIEW OF SYSTEMS    Constitutional:    No fever, no weight loss  Skin:     No rash  Otolaryngeal:    No difficulty swallowing  Cardiovascular: See HPI.  Pulmonary:    No cough, no sputum production    Personal History     Social History:    reports that she has never smoked. She has never used smokeless tobacco. She reports that she does not currently use alcohol. She reports that she does not use drugs.    Home Medications:  Current Outpatient Medications on File Prior to Visit   Medication Sig    hydroCHLOROthiazide (HYDRODIURIL) 25 MG tablet Take 1 tablet by mouth Daily.    levothyroxine (SYNTHROID, LEVOTHROID) 125 MCG tablet Take 1 tablet by mouth Daily.    lisinopril (PRINIVIL,ZESTRIL) 10 MG tablet TAKE ONE TABLET BY MOUTH DAILY    metoprolol succinate XL (TOPROL-XL) 25 MG 24 hr tablet TAKE ONE TABLET BY MOUTH TWICE A DAY    rosuvastatin (CRESTOR) 10 MG tablet Take 1 tablet by mouth Daily.     No current facility-administered medications on file prior to visit.       Past Medical History:   Diagnosis Date    Arrhythmia     Candidosis of skin 2014    Condition not found 2016    FREQUENCY    HBP (high blood pressure)     High cholesterol     Hypothyroidism     Mixed incontinence 2016    Stress incontinence 2014    Urge incontinence 2014    Urgency incontinence 2016       Allergies:  Allergies   Allergen Reactions    Niacin Rash       Objective    Objective       Vitals:   Heart Rate:  [84] 84  BP: (144)/(68) 144/68  Body mass index is 34.93 kg/m².     PHYSICAL EXAM:    General Appearance:   well developed  well nourished  HENT:   oropharynx moist  lips  not cyanotic  Neck:  thyroid not enlarged  supple  Respiratory:  no respiratory distress  normal breath sounds  no rales  Cardiovascular:  no jugular venous distention  regular rhythm  apical impulse normal  S1 normal, S2 normal  no S3, no S4   no murmur  no rub, no thrill  carotid pulses normal; no bruit  pedal pulses normal  lower extremity edema: none    Skin:   warm, dry  Psychiatric:  judgement and insight appropriate  normal mood and affect        Result Review:  I have personally reviewed the available results from  [x]  Laboratory  [x]  EKG  [x]  Cardiology  [x]  Medications  [x]  Old records  []  Other:     Procedures    Impression/Plan:   1.  Stable palpitations: Continue Toprol-XL 25 mg once a day.  No further palpitations.  2.  Essential hypertension controlled: Continue Zestril 10 mg once a day.  Continue hydrochlorothiazide 25 mg once a day.  Blood pressure controlled at home.  3.  Hypothyroidism: Continue Synthroid 125 mcg once a day           Jerome Morgan MD   12/18/23   10:50 EST

## 2023-12-18 ENCOUNTER — OFFICE VISIT (OUTPATIENT)
Dept: CARDIOLOGY | Facility: CLINIC | Age: 83
End: 2023-12-18
Payer: MEDICARE

## 2023-12-18 VITALS
HEART RATE: 84 BPM | SYSTOLIC BLOOD PRESSURE: 144 MMHG | BODY MASS INDEX: 35.15 KG/M2 | WEIGHT: 191 LBS | DIASTOLIC BLOOD PRESSURE: 68 MMHG | HEIGHT: 62 IN

## 2023-12-18 DIAGNOSIS — R00.2 PALPITATIONS: Primary | ICD-10-CM

## 2023-12-18 DIAGNOSIS — I10 HYPERTENSION, ESSENTIAL: ICD-10-CM

## 2023-12-18 PROCEDURE — 99214 OFFICE O/P EST MOD 30 MIN: CPT | Performed by: SPECIALIST

## 2023-12-18 PROCEDURE — 1159F MED LIST DOCD IN RCRD: CPT | Performed by: SPECIALIST

## 2023-12-18 PROCEDURE — 1160F RVW MEDS BY RX/DR IN RCRD: CPT | Performed by: SPECIALIST

## 2024-03-05 RX ORDER — METOPROLOL SUCCINATE 25 MG/1
25 TABLET, EXTENDED RELEASE ORAL 2 TIMES DAILY
Qty: 180 TABLET | Refills: 0 | Status: SHIPPED | OUTPATIENT
Start: 2024-03-05

## 2024-06-20 RX ORDER — METOPROLOL SUCCINATE 25 MG/1
25 TABLET, EXTENDED RELEASE ORAL 2 TIMES DAILY
Qty: 180 TABLET | Refills: 3 | Status: SHIPPED | OUTPATIENT
Start: 2024-06-20

## 2024-06-20 NOTE — PROGRESS NOTES
[facility]  Cardiology progress Note    Patient Name: Leonora Yu  : 1940    CHIEF COMPLAINT  Hypertension        Subjective   Subjective     HISTORY OF PRESENT ILLNESS    Leonora Yu is a 83 y.o. female with history of hypertension and palpitations.  No further palpitations.  Complains of pedal edema.    REVIEW OF SYSTEMS    Constitutional:    No fever, no weight loss  Skin:     No rash  Otolaryngeal:    No difficulty swallowing  Cardiovascular: See HPI.  Pulmonary:    No cough, no sputum production    Personal History     Social History:    reports that she has never smoked. She has never used smokeless tobacco. She reports that she does not currently use alcohol. She reports that she does not use drugs.    Home Medications:  Current Outpatient Medications on File Prior to Visit   Medication Sig    hydroCHLOROthiazide (HYDRODIURIL) 25 MG tablet Take 1 tablet by mouth Daily.    lisinopril (PRINIVIL,ZESTRIL) 10 MG tablet TAKE ONE TABLET BY MOUTH DAILY    metoprolol succinate XL (TOPROL-XL) 25 MG 24 hr tablet TAKE 1 TABLET BY MOUTH 2 TIMES A DAY    rosuvastatin (CRESTOR) 10 MG tablet Take 1 tablet by mouth Daily.    Synthroid 112 MCG tablet Take 1 tablet by mouth.    [DISCONTINUED] levothyroxine (SYNTHROID, LEVOTHROID) 125 MCG tablet Take 1 tablet by mouth Daily.     No current facility-administered medications on file prior to visit.       Past Medical History:   Diagnosis Date    Arrhythmia     Candidosis of skin 2014    Condition not found 2016    FREQUENCY    HBP (high blood pressure)     High cholesterol     Hypothyroidism     Mixed incontinence 2016    Stress incontinence 2014    Urge incontinence 2014    Urgency incontinence 2016       Allergies:  Allergies   Allergen Reactions    Niacin Rash       Objective    Objective       Vitals:   Heart Rate:  [71-76] 71  BP: (157-164)/(72-74) 157/74  Body mass index is 33.47 kg/m².     PHYSICAL  EXAM:    General Appearance:   well developed  well nourished  HENT:   oropharynx moist  lips not cyanotic  Neck:  thyroid not enlarged  supple  Respiratory:  no respiratory distress  normal breath sounds  no rales  Cardiovascular:  no jugular venous distention  regular rhythm  apical impulse normal  S1 normal, S2 normal  no S3, no S4   no murmur  no rub, no thrill  carotid pulses normal; no bruit  pedal pulses normal  lower extremity edema: 1 plus  Skin:   warm, dry  Psychiatric:  judgement and insight appropriate  normal mood and affect        Result Review:  I have personally reviewed the available results from  [x]  Laboratory  [x]  EKG  [x]  Cardiology  [x]  Medications  [x]  Old records  []  Other:     Procedures    Impression/Plan:  1.  Mixed hyperlipidemia: Continue Crestor 10 mg once a day.  Monitor lipid and hepatic profile.  2.  Essential hypertension controlled: Continue lisinopril 10 mg once a day.  Continue hydrochlorothiazide 25 mg once a day.  Monitor blood pressure regularly.  3.  Stable palpitations: Continue Toprol-XL 25 mg twice a day.  No further palpitations.  4.  Hypothyroidism: Continue Synthroid 125 mcg once a day.  4.  Pedal edema/chronic diastolic heart failure: Discontinue hydrochlorothiazide.  Add Lasix 20 mg once a day.  Check BMP and BNP.  Echocardiogram.        Jerome Morgan MD   06/24/24   13:30 EDT

## 2024-06-24 ENCOUNTER — OFFICE VISIT (OUTPATIENT)
Dept: CARDIOLOGY | Facility: CLINIC | Age: 84
End: 2024-06-24
Payer: MEDICARE

## 2024-06-24 VITALS
DIASTOLIC BLOOD PRESSURE: 74 MMHG | SYSTOLIC BLOOD PRESSURE: 157 MMHG | WEIGHT: 183 LBS | HEIGHT: 62 IN | BODY MASS INDEX: 33.68 KG/M2 | HEART RATE: 71 BPM

## 2024-06-24 DIAGNOSIS — E78.2 HYPERLIPEMIA, MIXED: Primary | ICD-10-CM

## 2024-06-24 DIAGNOSIS — I50.32 DIASTOLIC CHF, CHRONIC: ICD-10-CM

## 2024-06-24 DIAGNOSIS — E03.9 HYPOTHYROIDISM (ACQUIRED): ICD-10-CM

## 2024-06-24 DIAGNOSIS — R00.2 PALPITATIONS: ICD-10-CM

## 2024-06-24 DIAGNOSIS — I10 HYPERTENSION, ESSENTIAL: ICD-10-CM

## 2024-06-24 PROCEDURE — 99214 OFFICE O/P EST MOD 30 MIN: CPT | Performed by: SPECIALIST

## 2024-06-24 RX ORDER — FUROSEMIDE 20 MG/1
20 TABLET ORAL DAILY
Qty: 90 TABLET | Refills: 3 | Status: SHIPPED | OUTPATIENT
Start: 2024-06-24

## 2024-06-24 RX ORDER — LEVOTHYROXINE SODIUM 112 MCG
1 TABLET ORAL
COMMUNITY
Start: 2024-03-07

## 2024-06-24 NOTE — PATIENT INSTRUCTIONS
"Low-Sodium Eating Plan  Salt (sodium) helps you keep a healthy balance of fluids in your body. Too much sodium can raise your blood pressure. It can also cause fluid and waste to be held in your body.  Your health care provider or dietitian may recommend a low-sodium eating plan if you have high blood pressure (hypertension), kidney disease, liver disease, or heart failure. Eating less sodium can help lower your blood pressure and reduce swelling. It can also protect your heart, liver, and kidneys.  What are tips for following this plan?  Reading food labels    Check food labels for the amount of sodium per serving. If you eat more than one serving, you must multiply the listed amount by the number of servings.  Choose foods with less than 140 milligrams (mg) of sodium per serving.  Avoid foods with 300 mg of sodium or more per serving.  Always check how much sodium is in a product, even if the label says \"unsalted\" or \"no salt added.\"  Shopping    Buy products labeled as \"low-sodium\" or \"no salt added.\"  Buy fresh foods.  Avoid canned foods and pre-made or frozen meals.  Avoid canned, cured, or processed meats.  Buy breads that have less than 80 mg of sodium per slice.  Cooking    Eat more home-cooked food. Try to eat less restaurant, buffet, and fast food.  Try not to add salt when you cook. Use salt-free seasonings or herbs instead of table salt or sea salt. Check with your provider or pharmacist before using salt substitutes.  Cook with plant-based oils, such as canola, sunflower, or olive oil.  Meal planning  When eating at a restaurant, ask if your food can be made with less salt or no salt. Avoid dishes labeled as brined, pickled, cured, or smoked. Avoid dishes made with soy sauce, miso, or teriyaki sauce.  Avoid foods that have monosodium glutamate (MSG) in them. MSG may be added to some restaurant food, sauces, soups, bouillon, and canned foods.  Make meals that can be grilled, baked, poached, roasted, or " "steamed. These are often made with less sodium.  General information  Try to limit your sodium intake to 1,500-2,300 mg each day, or the amount told by your provider.  What foods should I eat?  Fruits  Fresh, frozen, or canned fruit. Fruit juice.  Vegetables  Fresh or frozen vegetables. \"No salt added\" canned vegetables. \"No salt added\" tomato sauce and paste. Low-sodium or reduced-sodium tomato and vegetable juice.  Grains  Low-sodium cereals, such as oats, puffed wheat and rice, and shredded wheat. Low-sodium crackers. Unsalted rice. Unsalted pasta. Low-sodium bread. Whole grain breads and whole grain pasta.  Meats and other proteins  Fresh or frozen meat, poultry, seafood, and fish. These should have no added salt. Low-sodium canned tuna and salmon. Unsalted nuts. Dried peas, beans, and lentils without added salt. Unsalted canned beans. Eggs. Unsalted nut butters.  Dairy  Milk. Soy milk. Cheese that is naturally low in sodium, such as ricotta cheese, fresh mozzarella, or Swiss cheese. Low-sodium or reduced-sodium cheese. Cream cheese. Yogurt.  Seasonings and condiments  Fresh and dried herbs and spices. Salt-free seasonings. Low-sodium mustard and ketchup. Sodium-free salad dressing. Sodium-free light mayonnaise. Fresh or refrigerated horseradish. Lemon juice. Vinegar.  Other foods  Homemade, reduced-sodium, or low-sodium soups. Unsalted popcorn and pretzels. Low-salt or salt-free chips.  The items listed above may not be all the foods and drinks you can have. Talk to a dietitian to learn more.  What foods should I avoid?  Vegetables  Sauerkraut, pickled vegetables, and relishes. Olives. French fries. Onion rings. Regular canned vegetables, except low-sodium or reduced-sodium items. Regular canned tomato sauce and paste. Regular tomato and vegetable juice. Frozen vegetables in sauces.  Grains  Instant hot cereals. Bread stuffing, pancake, and biscuit mixes. Croutons. Seasoned rice or pasta mixes. Noodle soup " cups. Boxed or frozen macaroni and cheese. Regular salted crackers. Self-rising flour.  Meats and other proteins  Meat or fish that is salted, canned, smoked, spiced, or pickled. Precooked or cured meat, such as sausages or meat loaves. Riggs. Ham. Pepperoni. Hot dogs. Corned beef. Chipped beef. Salt pork. Jerky. Pickled herring, anchovies, and sardines. Regular canned tuna. Salted nuts.  Dairy  Processed cheese and cheese spreads. Hard cheeses. Cheese curds. Blue cheese. Feta cheese. String cheese. Regular cottage cheese. Buttermilk. Canned milk.  Fats and oils  Salted butter. Regular margarine. Ghee. Riggs fat.  Seasonings and condiments  Onion salt, garlic salt, seasoned salt, table salt, and sea salt. Canned and packaged gravies. Worcestershire sauce. Tartar sauce. Barbecue sauce. Teriyaki sauce. Soy sauce, including reduced-sodium soy sauce. Steak sauce. Fish sauce. Oyster sauce. Cocktail sauce. Horseradish that you find on the shelf. Regular ketchup and mustard. Meat flavorings and tenderizers. Bouillon cubes. Hot sauce. Pre-made or packaged marinades. Pre-made or packaged taco seasonings. Relishes. Regular salad dressings. Salsa.  Other foods  Salted popcorn and pretzels. Corn chips and puffs. Potato and tortilla chips. Canned or dried soups. Pizza. Frozen entrees and pot pies.  The items listed above may not be all the foods and drinks you should avoid. Talk to a dietitian to learn more.  This information is not intended to replace advice given to you by your health care provider. Make sure you discuss any questions you have with your health care provider.  Document Revised: 01/04/2024 Document Reviewed: 01/04/2024  Elsevier Patient Education © 2024 [employer]

## 2024-06-27 ENCOUNTER — TELEPHONE (OUTPATIENT)
Dept: CARDIOLOGY | Facility: CLINIC | Age: 84
End: 2024-06-27
Payer: MEDICARE

## 2024-06-27 NOTE — TELEPHONE ENCOUNTER
I spoke to patient and let her know I have already faxed the order to [facility]. I will call in the morning to get her an appointment. I also informed her I had already gotten the pre-approval from her insurance and that she would need to call [employer] to ask for an out of pocket price.

## 2024-06-27 NOTE — TELEPHONE ENCOUNTER
Caller: Leonora Yu    Relationship: Self    Best call back number: [phone]    What orders are you requesting (i.e. lab or imaging): ECHO     In what timeframe would the patient need to come in: AS ADVISED     Where will you receive your lab/imaging services: [facility]    Additional notes: PATIENT WOULD LIKE TO GO THERE SINCE IT IS CLOSER TO HER HOWEVER SHE WANTS TO MAKE HER INSURANCE WILL PAY FOR IT.

## 2024-08-02 DIAGNOSIS — I50.32 DIASTOLIC CHF, CHRONIC: ICD-10-CM

## 2024-08-07 RX ORDER — LISINOPRIL 10 MG/1
10 TABLET ORAL DAILY
Qty: 90 TABLET | Refills: 3 | Status: SHIPPED | OUTPATIENT
Start: 2024-08-07

## 2024-08-22 ENCOUNTER — TELEPHONE (OUTPATIENT)
Dept: CARDIOLOGY | Facility: CLINIC | Age: 84
End: 2024-08-22
Payer: MEDICARE

## 2024-08-22 DIAGNOSIS — I50.32 DIASTOLIC CHF, CHRONIC: ICD-10-CM

## 2024-08-22 NOTE — TELEPHONE ENCOUNTER
----- Message from Kaitlynn Miles sent at 8/22/2024 11:28 AM EDT -----  Echocardiogram performed on 7/23/2024 shows normal heart muscle function with severely dilated left atria.  This can cause shortness of breath, check BMP and proBNP if not recently checked.  There is mild mitral regurgitation murmur noted which will be monitored with repeat echocardiogram in 2 to 3 years.  If shortness of breath continues send recommend stress test several ischemia.  Follow-up as scheduled and notify office if symptoms persist or worsen

## 2024-08-22 NOTE — TELEPHONE ENCOUNTER
SW patient. Patient denies any SOA. Patient recently had BNP and BMP done with PCP. PCP office to fax results

## 2025-01-10 NOTE — PROGRESS NOTES
[facility]  Cardiology progress Note    Patient Name: Leonora Yu  : 1940    CHIEF COMPLAINT  Hypertension        Subjective   Subjective     HISTORY OF PRESENT ILLNESS    Leonora Yu is a 84 y.o. female with history of hypertension and palpitations.  No further palpitations.    REVIEW OF SYSTEMS    Constitutional:    No fever, no weight loss  Skin:     No rash  Otolaryngeal:    No difficulty swallowing  Cardiovascular: See HPI.  Pulmonary:    No cough, no sputum production    Personal History     Social History:    reports that she has never smoked. She has never used smokeless tobacco. She reports that she does not currently use alcohol. She reports that she does not use drugs.    Home Medications:  Current Outpatient Medications on File Prior to Visit   Medication Sig    furosemide (LASIX) 20 MG tablet Take 1 tablet by mouth Daily.    metoprolol succinate XL (TOPROL-XL) 25 MG 24 hr tablet TAKE 1 TABLET BY MOUTH 2 TIMES A DAY    rosuvastatin (CRESTOR) 10 MG tablet Take 1 tablet by mouth Daily.    Synthroid 112 MCG tablet Take 1 tablet by mouth.    [DISCONTINUED] lisinopril (PRINIVIL,ZESTRIL) 10 MG tablet TAKE 1 TABLET BY MOUTH DAILY     No current facility-administered medications on file prior to visit.       Past Medical History:   Diagnosis Date    Arrhythmia     Candidosis of skin 2014    Condition not found 2016    FREQUENCY    HBP (high blood pressure)     High cholesterol     Hypothyroidism     Mixed incontinence 2016    Stress incontinence 2014    Urge incontinence 2014    Urgency incontinence 2016       Allergies:  Allergies   Allergen Reactions    Niacin Rash       Objective    Objective       Vitals:   Heart Rate:  [79-80] 80  BP: (146-162)/() 162/92  Body mass index is 34.39 kg/m².     PHYSICAL EXAM:    General Appearance:   well developed  well nourished  HENT:   oropharynx moist  lips not cyanotic  Neck:  thyroid not  enlarged  supple  Respiratory:  no respiratory distress  normal breath sounds  no rales  Cardiovascular:  no jugular venous distention  regular rhythm  apical impulse normal  S1 normal, S2 normal  no S3, no S4   no murmur  no rub, no thrill  carotid pulses normal; no bruit  pedal pulses normal  lower extremity edema: none    Skin:   warm, dry  Psychiatric:  judgement and insight appropriate  normal mood and affect        Result Review:  I have personally reviewed the available results from  [x]  Laboratory  [x]  EKG  [x]  Cardiology  [x]  Medications  [x]  Old records  []  Other:     Procedures    Results for orders placed in visit on 08/21/24    Adult Transthoracic Echo Complete W/ Cont if Necessary Per Protocol     Impression/Plan:  1.  Mixed hyperlipidemia: Continue Crestor 10 mg once a day.  Monitor lipid and hepatic profile.  2.  Essential hypertension Uncontrolled: Increase lisinopril to 20 mg once a day.    Monitor blood pressure regularly.  3.  Stable palpitations: Continue Toprol-XL 25 mg twice a day.  No further palpitations.  4.  Hypothyroidism: Continue Synthroid 125 mcg once a day.  4.  Pedal edema/chronic diastolic heart failure: Continue Lasix 20 mg once a day.  Recent echo shows normal left ventricular systolic function.                 Jerome Morgan MD   01/13/25   13:43 EST

## 2025-01-13 ENCOUNTER — OFFICE VISIT (OUTPATIENT)
Dept: CARDIOLOGY | Facility: CLINIC | Age: 85
End: 2025-01-13
Payer: MEDICARE

## 2025-01-13 VITALS
WEIGHT: 188 LBS | DIASTOLIC BLOOD PRESSURE: 92 MMHG | HEART RATE: 80 BPM | BODY MASS INDEX: 34.6 KG/M2 | HEIGHT: 62 IN | SYSTOLIC BLOOD PRESSURE: 162 MMHG

## 2025-01-13 DIAGNOSIS — I50.32 DIASTOLIC CHF, CHRONIC: Primary | ICD-10-CM

## 2025-01-13 DIAGNOSIS — R00.2 PALPITATIONS: ICD-10-CM

## 2025-01-13 DIAGNOSIS — I10 HYPERTENSION, ESSENTIAL: ICD-10-CM

## 2025-01-13 PROCEDURE — 1159F MED LIST DOCD IN RCRD: CPT | Performed by: SPECIALIST

## 2025-01-13 PROCEDURE — 1160F RVW MEDS BY RX/DR IN RCRD: CPT | Performed by: SPECIALIST

## 2025-01-13 PROCEDURE — 99214 OFFICE O/P EST MOD 30 MIN: CPT | Performed by: SPECIALIST

## 2025-01-13 RX ORDER — LISINOPRIL 20 MG/1
20 TABLET ORAL DAILY
Qty: 90 TABLET | Refills: 5 | Status: SHIPPED | OUTPATIENT
Start: 2025-01-13

## 2025-07-02 NOTE — PROGRESS NOTES
[facility]  Cardiology progress Note    Patient Name: Leonora Yu  : 1940    CHIEF COMPLAINT  Hypertension        Subjective   Subjective     HISTORY OF PRESENT ILLNESS    Leonora Yu is a 84 y.o. female with history of hypertension.    REVIEW OF SYSTEMS    Constitutional:    No fever, no weight loss  Skin:     No rash  Otolaryngeal:    No difficulty swallowing  Cardiovascular: See HPI.  Pulmonary:    No cough, no sputum production    Personal History     Social History:    reports that she has never smoked. She has never used smokeless tobacco. She reports that she does not currently use alcohol. She reports that she does not use drugs.    Home Medications:  Current Outpatient Medications on File Prior to Visit   Medication Sig    furosemide (LASIX) 20 MG tablet Take 1 tablet by mouth Daily.    lisinopril (PRINIVIL,ZESTRIL) 20 MG tablet Take 1 tablet by mouth Daily.    metoprolol succinate XL (TOPROL-XL) 25 MG 24 hr tablet TAKE 1 TABLET BY MOUTH 2 TIMES A DAY    rosuvastatin (CRESTOR) 10 MG tablet Take 1 tablet by mouth Daily.    Synthroid 112 MCG tablet Take 1 tablet by mouth.    traMADol (Ultram) 50 MG tablet Take 1 tablet by mouth As Needed for Moderate Pain or Severe Pain.    ubrogepant (Ubrelvy) 100 MG tablet Take 1 tablet by mouth.    Zofran 4 MG tablet Take 1 tablet by mouth Every 8 (Eight) Hours As Needed.     No current facility-administered medications on file prior to visit.       Past Medical History:   Diagnosis Date    Arrhythmia     Candidosis of skin 2014    Condition not found 2016    FREQUENCY    HBP (high blood pressure)     High cholesterol     Hypothyroidism     Mixed incontinence 2016    Stress incontinence 2014    Urge incontinence 2014    Urgency incontinence 2016       Allergies:  Allergies   Allergen Reactions    Niacin Rash       Objective    Objective       Vitals:   Heart Rate:  [82] 82  BP: (129)/(52) 129/52  Body mass  index is 33.58 kg/m².     PHYSICAL EXAM:    General Appearance:   well developed  well nourished  HENT:   oropharynx moist  lips not cyanotic  Neck:  thyroid not enlarged  supple  Respiratory:  no respiratory distress  normal breath sounds  no rales  Cardiovascular:  no jugular venous distention  regular rhythm  apical impulse normal  S1 normal, S2 normal  no S3, no S4   no murmur  no rub, no thrill  carotid pulses normal; no bruit  pedal pulses normal  lower extremity edema: none    Skin:   warm, dry  Psychiatric:  judgement and insight appropriate  normal mood and affect        Result Review:  I have personally reviewed the available results from  [x]  Laboratory  [x]  EKG  [x]  Cardiology  [x]  Medications  [x]  Old records  []  Other:     Procedures    Results for orders placed in visit on 08/21/24    Adult Transthoracic Echo Complete W/ Cont if Necessary Per Protocol      Impression/Plan:  1.  Mixed hyperlipidemia: Continue Crestor 10 mg once a day.  Monitor lipid and hepatic profile.  2.  Essential hypertension controlled: Continue lisinopril 20 mg once a day.    Monitor blood pressure regularly.  3.  Stable palpitations: Continue Toprol-XL 25 mg twice a day.  No further palpitations.  4.  Hypothyroidism: Continue Synthroid 125 mcg once a day.  4.  Pedal edema/chronic diastolic heart failure: Continue Lasix 20 mg once a day.  Recent echo shows normal left ventricular systolic function.              Jerome Morgan MD   07/07/25   12:57 EDT

## 2025-07-07 ENCOUNTER — OFFICE VISIT (OUTPATIENT)
Dept: CARDIOLOGY | Facility: CLINIC | Age: 85
End: 2025-07-07
Payer: MEDICARE

## 2025-07-07 VITALS
HEART RATE: 82 BPM | BODY MASS INDEX: 33.79 KG/M2 | SYSTOLIC BLOOD PRESSURE: 129 MMHG | WEIGHT: 183.6 LBS | DIASTOLIC BLOOD PRESSURE: 52 MMHG | HEIGHT: 62 IN

## 2025-07-07 DIAGNOSIS — I10 HYPERTENSION, ESSENTIAL: Primary | ICD-10-CM

## 2025-07-07 DIAGNOSIS — E78.2 HYPERLIPEMIA, MIXED: ICD-10-CM

## 2025-07-07 DIAGNOSIS — R00.2 PALPITATIONS: ICD-10-CM

## 2025-07-07 PROCEDURE — 99214 OFFICE O/P EST MOD 30 MIN: CPT | Performed by: SPECIALIST

## 2025-07-07 RX ORDER — TRAMADOL HYDROCHLORIDE 50 MG/1
50 TABLET ORAL AS NEEDED
COMMUNITY
Start: 2025-02-10

## 2025-07-07 RX ORDER — ONDANSETRON HYDROCHLORIDE 4 MG/1
4 TABLET, FILM COATED ORAL EVERY 8 HOURS PRN
COMMUNITY
Start: 2025-06-26